# Patient Record
Sex: MALE | Race: WHITE | NOT HISPANIC OR LATINO
[De-identification: names, ages, dates, MRNs, and addresses within clinical notes are randomized per-mention and may not be internally consistent; named-entity substitution may affect disease eponyms.]

---

## 2022-05-20 PROBLEM — Z00.00 ENCOUNTER FOR PREVENTIVE HEALTH EXAMINATION: Status: ACTIVE | Noted: 2022-05-20

## 2022-05-24 ENCOUNTER — APPOINTMENT (OUTPATIENT)
Dept: RADIATION ONCOLOGY | Facility: CLINIC | Age: 72
End: 2022-05-24
Payer: MEDICARE

## 2022-05-24 VITALS
DIASTOLIC BLOOD PRESSURE: 59 MMHG | HEART RATE: 53 BPM | SYSTOLIC BLOOD PRESSURE: 163 MMHG | HEIGHT: 70 IN | RESPIRATION RATE: 18 BRPM | OXYGEN SATURATION: 99 %

## 2022-05-24 PROCEDURE — 99205 OFFICE O/P NEW HI 60 MIN: CPT | Mod: 25

## 2022-05-26 NOTE — DISEASE MANAGEMENT
[Clinical] : TNM Stage: c [I] : I [FreeTextEntry4] : Adenocarcinoma of the prostate, GS 3+3, PSA 9.38ng/ml [TTNM] : 1c [NTNM] : 0 [MTNM] : X

## 2022-05-26 NOTE — LETTER CLOSING
[Consult Closing:] : Thank you for allowing me to participate in the care of this patient.  If you have any questions, please do not hesitate to contact me. [Sincerely yours,] : Sincerely yours, [FreeTextEntry3] : Catherine Mondragon MD\par

## 2022-05-26 NOTE — HISTORY OF PRESENT ILLNESS
[FreeTextEntry1] : Mr. Bloomberg is a 71 year old male, diagnosed with clinical stage, [id number] low risk prostate adenocarcinoma, Howard score (3+3) and a pre-treatment PSA of 9.38 ng/ml, he is referred for a consultation to discuss radiation therapy options. \par \par Mr. Bloomberg is a patient under the care of Dr. York with a long standing history of an enlarged prostate over 10 years and elevated PSA since . He underwent US prostate in 2020 that revealed evidence of mild prostatic hypertrophy without areas suspicious for neoplasm identified. Mr. Bloomberg's PSA slowly began to rise.  In 2021 it was 6.88ng/ml.\par \par In May 2021, he underwent prostate biopsy and pathology revealed prostate, left mid, biopsy: PIN-like ductal adenocarcinoma of prostate, Di[place] grade 6 (3+3), imeasuring 2mm in linear length, occupying 7.5% of submitted tissue. No evidence of perineural or lymphatic invasion. No evidence of periprostatic fat or seminal vesicle extension. All other cores were negative, biopsy revealed 1/12 cores positive for adenocarcinoma. \par \par Mr. Bloomberg decided to pursue active surveillance.  However, more recently his PSA began to  rise and he was referred for consultation for definitive treatment. \par \par MRI Prostate (22) - 2 lesions detected (right, posterior medial lateral and right, posterior lateral), Prostate size: 4.6 cm. Volume: 55 mL. PSA density: 0.16 ng/mL/mL. PSA density >0.15 ng/mL/mL: Yes.  No extraprostatic extension, seminal vesicle invasion, or pelvic lymphadenopathy.  \par \par He is experiencing urinary symptoms such as, weak stream, difficulty starting stream occasionally, variable frequency and urgency and nocturia on average 2 - 3 times per night. Minimal dysuria, not significant as per patient. No bowel symptoms and he denies a history or irritable bowel syndrome. He denies erectile dysfunction issues and is sexually active. \par \par PSA\par 9.38 () \par 8.76 ng/ml (22)\par 8.23 ng/ml (11/3/21) \par 8.01 ng/ml (2021) \par 6.88 ng/ml (21) \par \par Family history of: brother, 70, CLL; mother, 99 years, breast and colon,  from CHF\par Social Hx: never a smoker, 2 - 4 glass per week.

## 2022-08-29 ENCOUNTER — TRANSCRIPTION ENCOUNTER (OUTPATIENT)
Age: 72
End: 2022-08-29

## 2024-04-22 ENCOUNTER — APPOINTMENT (OUTPATIENT)
Dept: RADIATION ONCOLOGY | Facility: CLINIC | Age: 74
End: 2024-04-22
Payer: MEDICARE

## 2024-04-22 VITALS
DIASTOLIC BLOOD PRESSURE: 75 MMHG | HEIGHT: 70 IN | HEART RATE: 58 BPM | OXYGEN SATURATION: 96 % | SYSTOLIC BLOOD PRESSURE: 141 MMHG | RESPIRATION RATE: 18 BRPM

## 2024-04-22 PROCEDURE — 99215 OFFICE O/P EST HI 40 MIN: CPT | Mod: 25

## 2024-04-22 RX ORDER — PRAVASTATIN SODIUM 20 MG/1
20 TABLET ORAL
Refills: 0 | Status: ACTIVE | COMMUNITY

## 2024-04-22 NOTE — VITALS
[Date: ____________] : Patient's last distress assessment performed on [unfilled]. [1 - Distress Level] : Distress Level: 1

## 2024-04-22 NOTE — REVIEW OF SYSTEMS
[Nocturia] : nocturia [Urinary Frequency] : urinary frequency [IPSS Score (0-40): ___] : IPSS score: [unfilled] [EPIC-CP Score (0-60): ___] : EPIC-CP score: [unfilled] [Negative] : Allergic/Immunologic

## 2024-04-24 NOTE — LETTER CLOSING
[Consult Closing:] : Thank you for allowing me to participate in the care of this patient.  If you have any questions, please do not hesitate to contact me. [Sincerely yours,] : Sincerely yours, [FreeTextEntry3] : Catherine Mondragon MD

## 2024-04-24 NOTE — DISEASE MANAGEMENT
[FreeTextEntry4] : Adenocarcinoma of the prostate, GS 3+3, PSA 9.38ng/ml [TTNM] : 1c [NTNM] : 0 [MTNM] : X

## 2024-04-24 NOTE — HISTORY OF PRESENT ILLNESS
[FreeTextEntry1] : Mr. Bloomberg is a 71 year old male, diagnosed with clinical stage, [id number] low risk prostate adenocarcinoma, Howard score (3+3) and a pre-treatment PSA of 9.38 ng/ml, he is referred for a consultation to discuss radiation therapy options. He is present today for re-evaluation after imaging demonstrated potential osseous metastasis.   Mr. Bloomberg is a patient under the care of Dr. York with a long standing history of an enlarged prostate over 10 years and elevated PSA since . He underwent US prostate in 2020 that revealed evidence of mild prostatic hypertrophy without areas suspicious for neoplasm identified. Mr. Bloomberg's PSA slowly began to rise.  In 2021 it was 6.88ng/ml.  In May 2021, he underwent prostate biopsy and pathology revealed prostate, left mid, biopsy: PIN-like ductal adenocarcinoma of prostate, Howard grade 6 (3+3), measuring 2mm in linear length, occupying 7.5% of submitted tissue. No evidence of perineural or lymphatic invasion. No evidence of periprostatic fat or seminal vesicle extension. All other cores were negative, biopsy revealed 1/12 cores positive for adenocarcinoma.   Mr. Bloomberg decided to pursue active surveillance.  However, more recently his PSA began to rise and he was referred for consultation for definitive treatment.   MRI Prostate (22) - 2 lesions detected (right, posterior medial lateral and right, posterior lateral), Prostate size: 4.6 cm. Volume: 55 mL. PSA density: 0.16 ng/mL/mL. PSA density >0.15 ng/mL/mL: Yes.  No extraprostatic extension, seminal vesicle invasion, or pelvic lymphadenopathy.    PSA 9.48 (24) 9.38 ()  8.76 ng/ml (22) 8.23 ng/ml (11/3/21)  8.01 ng/ml (2021)  6.88 ng/ml (21)   Family history of: brother, 70, CLL; mother, 99 years, breast and colon,  from CHF Social Hx: never a smoker, 2 - 4 glass per week.   MRI (2024) - bilateral acetabulum.   Bone scan (2024) focal sites of intense increased tracer localization in the right lower iliac bone and medial left acetabulum  suspicious for metastatic disease. Additional site in the spine and multiple joint, most likely arthritic.   PSA is now 9.48 ng/ml. He reports nocturia 3 times per night, weak urinary stream, difficulty starting stream, no bowel symptoms and had colonoscopy in 2017.  He denies any erectile dysfunction issues.   He reports bilateral hip pain, rating it a 1 - 2 out of 10 on the pain scale. He reports chronic lower back pain.   Mr. Bloomberg is present today to discuss potential palliative therapy to the prostate and potential areas of osseous metastasis.

## 2024-05-01 ENCOUNTER — NON-APPOINTMENT (OUTPATIENT)
Age: 74
End: 2024-05-01

## 2024-05-01 ENCOUNTER — RESULT REVIEW (OUTPATIENT)
Age: 74
End: 2024-05-01

## 2024-05-14 ENCOUNTER — RESULT REVIEW (OUTPATIENT)
Age: 74
End: 2024-05-14

## 2024-05-14 ENCOUNTER — APPOINTMENT (OUTPATIENT)
Dept: HEMATOLOGY ONCOLOGY | Facility: CLINIC | Age: 74
End: 2024-05-14
Payer: MEDICARE

## 2024-05-14 VITALS
HEIGHT: 70 IN | SYSTOLIC BLOOD PRESSURE: 131 MMHG | HEART RATE: 48 BPM | RESPIRATION RATE: 16 BRPM | DIASTOLIC BLOOD PRESSURE: 72 MMHG | WEIGHT: 166.44 LBS | BODY MASS INDEX: 23.83 KG/M2 | OXYGEN SATURATION: 98 % | TEMPERATURE: 97.1 F

## 2024-05-14 DIAGNOSIS — Z80.7 FAMILY HISTORY OF OTHER MALIGNANT NEOPLASMS OF LYMPHOID, HEMATOPOIETIC AND RELATED TISSUES: ICD-10-CM

## 2024-05-14 DIAGNOSIS — Z80.3 FAMILY HISTORY OF MALIGNANT NEOPLASM OF BREAST: ICD-10-CM

## 2024-05-14 DIAGNOSIS — Z80.0 FAMILY HISTORY OF MALIGNANT NEOPLASM OF DIGESTIVE ORGANS: ICD-10-CM

## 2024-05-14 PROCEDURE — 99205 OFFICE O/P NEW HI 60 MIN: CPT

## 2024-05-14 PROCEDURE — 36415 COLL VENOUS BLD VENIPUNCTURE: CPT

## 2024-05-21 ENCOUNTER — RESULT REVIEW (OUTPATIENT)
Age: 74
End: 2024-05-21

## 2024-05-21 PROBLEM — Z80.0 FAMILY HISTORY OF MALIGNANT NEOPLASM OF COLON: Status: ACTIVE | Noted: 2024-05-14

## 2024-05-21 PROBLEM — Z80.7 FAMILY HISTORY OF NON-HODGKIN'S LYMPHOMA: Status: ACTIVE | Noted: 2024-05-14

## 2024-05-21 NOTE — HISTORY OF PRESENT ILLNESS
[Disease: _____________________] : Disease: [unfilled] [M: ___] : M[unfilled] [AJCC Stage: ____] : AJCC Stage: [unfilled] [de-identified] : 73-year-old male, born in [place] presents today for initial consultation of prostate cancer, referred by Dr. Mondragon.  Mr. Bloomberg is a patient under the care of Dr. York with a long standing history of an enlarged prostate over 10 years and elevated PSA since 2020. He underwent US prostate in January 2020 that revealed evidence of mild prostatic hypertrophy without areas suspicious for neoplasm identified. Mr. Bloomberg's PSA slowly began to rise. In 4/2021 it was 6.88ng/ml.  In May 2021, he underwent prostate biopsy and pathology revealed prostate, left mid, biopsy: PIN-like ductal adenocarcinoma of prostate, [place] grade 6 (3+3), measuring 2mm in linear length, occupying 7.5% of submitted tissue. No evidence of perineural or lymphatic invasion. No evidence of periprostatic fat or seminal vesicle extension. All other cores were negative, biopsy revealed 1/12 cores positive for adenocarcinoma.  Mr. Bloomberg decided to pursue active surveillance. However, more recently his PSA began to rise and he was referred for consultation for definitive treatment.  MRI Prostate (5/6/22) - 2 lesions detected (right, posterior medial lateral and right, posterior lateral), Prostate size: 4.6 cm. Volume: 55 mL. PSA density: 0.16 ng/mL/mL. PSA density >0.15 ng/mL/mL: Yes. No extraprostatic extension, seminal vesicle invasion, or pelvic lymphadenopathy.  PSA 9.48 (4/5/24) 9.38 (59/22) 8.76 ng/ml (2/7/22) 8.23 ng/ml (11/3/21) 8.01 ng/ml (8/2021) 6.88 ng/ml (4/8/21)  MRI Pelvis (4/2024) - bilateral acetabulum lesions concerning for met disease  Bone scan (4/2024) focal sites of intense increased tracer localization in the right lower iliac bone and medial left acetabulum suspicious for metastatic disease. Additional site in the spine and multiple joint, most likely arthritic.  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 Bone biopsy ordered by Dr. Mondragon- not scheduled as of yet.  Patient started Lupron q 3 months 5/13/24 and bicalutamide- with Dr. Ortiz   Family history: Brother, 70, CLL Mother, 99 years, breast and colon Son with NHL at mid 30's- alive and well   Social Hx:  never a smoker 2 - 4 glass per week. Denies illicit drug use Retired from [employer]   with 3 children

## 2024-05-21 NOTE — CONSULT LETTER
[Dear  ___] : Dear  [unfilled], [Consult Letter:] : I had the pleasure of evaluating your patient, [unfilled]. [Please see my note below.] : Please see my note below. [Consult Closing:] : Thank you very much for allowing me to participate in the care of this patient.  If you have any questions, please do not hesitate to contact me. [Sincerely,] : Sincerely, [FreeTextEntry3] : Cande Stephens MD [facility]

## 2024-05-21 NOTE — ASSESSMENT
[FreeTextEntry1] : 3-year-old male, born in [place] presents today for initial consultation of prostate cancer, referred by Dr. Mondragon.  Mr. Bloomberg is a patient under the care of Dr. York with a long-standing history of an enlarged prostate over 10 years and elevated PSA since 2020. He underwent US prostate in January 2020 that revealed evidence of mild prostatic hypertrophy without areas suspicious for neoplasm identified. Mr. Bloomberg's PSA slowly began to rise. In 4/2021 it was 6.88ng/ml.  In May 2021, he underwent prostate biopsy and pathology revealed prostate, left mid, biopsy: PIN-like ductal adenocarcinoma of prostate, Howard grade 6 (3+3), measuring 2mm in linear length, occupying 7.5% of submitted tissue. No evidence of perineural or lymphatic invasion. No evidence of periprostatic fat or seminal vesicle extension. All other cores were negative, biopsy revealed 1/12 cores positive for adenocarcinoma.  Mr. Bloomberg decided to pursue active surveillance. However, more recently his PSA began to rise and he was referred for consultation for definitive treatment.  PET PSMA (5/1/2024.)  Prostate cancer with metastasis to pelvic lymph nodes and osseous structure. Bone biopsy ordered Reviewed adverse effects of ADT including but not limited to hot flushes, loss of libido, erectile disfunction, loss of muscle mass, fatigue, anemia, breast enlargement, tenderness, depression with mood swings, hair loss, osteoporosis, increased risk of fractures, obesity, dyslipidemia, increased risk of diabetes and cardiovascular disease.  Reviewed modalities for treatment of metastatic PCA, ongoing therapy, combination tx ARB and ADT. Discussed weight control and healthy eating habits.  Encourage to participate in moderate exercise to mitigate side effects and consider starting statins. Check PSA, testosterone  # Plan for radiation therapy  #Patient offered genetic testing. We discussed: Plan for genetic panel.  Reviewed with patient impact of positive vs negative results and that test might not be informative or . We discussed that blood related family members might be carrying the same genetic mutation. Test confidentiality. Options or limitations of medical surveillance and strategies for prevention after genetic testing results. Importance of sharing genetic test results with at-risk relatives they might benefit from this information. Insurance coverage and potential out of pocket costs were also discussed. The Genetic Information Non-discrimination Act (AIDEE) was reviewed, and she was provided with written information regarding AIDEE. Plan for follow up after testing  # Bone mets - plan for Zometa - dental evaluation.

## 2024-05-22 ENCOUNTER — RESULT REVIEW (OUTPATIENT)
Age: 74
End: 2024-05-22

## 2024-05-23 RX ORDER — APALUTAMIDE 60 MG/1
60 TABLET, FILM COATED ORAL
Qty: 240 | Refills: 3 | Status: ACTIVE | COMMUNITY
Start: 2024-05-21 | End: 1900-01-01

## 2024-05-28 ENCOUNTER — NON-APPOINTMENT (OUTPATIENT)
Age: 74
End: 2024-05-28

## 2024-05-29 ENCOUNTER — NON-APPOINTMENT (OUTPATIENT)
Age: 74
End: 2024-05-29

## 2024-05-30 ENCOUNTER — NON-APPOINTMENT (OUTPATIENT)
Age: 74
End: 2024-05-30

## 2024-05-31 ENCOUNTER — NON-APPOINTMENT (OUTPATIENT)
Age: 74
End: 2024-05-31

## 2024-06-05 ENCOUNTER — TRANSCRIPTION ENCOUNTER (OUTPATIENT)
Age: 74
End: 2024-06-05

## 2024-06-06 ENCOUNTER — RESULT REVIEW (OUTPATIENT)
Age: 74
End: 2024-06-06

## 2024-06-06 ENCOUNTER — APPOINTMENT (OUTPATIENT)
Dept: HEMATOLOGY ONCOLOGY | Facility: CLINIC | Age: 74
End: 2024-06-06
Payer: MEDICARE

## 2024-06-06 VITALS
HEIGHT: 70 IN | TEMPERATURE: 98.6 F | BODY MASS INDEX: 22.65 KG/M2 | HEART RATE: 63 BPM | WEIGHT: 158.25 LBS | OXYGEN SATURATION: 96 % | RESPIRATION RATE: 16 BRPM | SYSTOLIC BLOOD PRESSURE: 147 MMHG | DIASTOLIC BLOOD PRESSURE: 80 MMHG

## 2024-06-06 DIAGNOSIS — C79.51 SECONDARY MALIGNANT NEOPLASM OF BONE: ICD-10-CM

## 2024-06-06 PROCEDURE — 36415 COLL VENOUS BLD VENIPUNCTURE: CPT

## 2024-06-06 PROCEDURE — 99214 OFFICE O/P EST MOD 30 MIN: CPT

## 2024-06-06 RX ORDER — TAMSULOSIN HYDROCHLORIDE 0.4 MG/1
0.4 CAPSULE ORAL
Refills: 0 | Status: ACTIVE | COMMUNITY

## 2024-06-14 PROBLEM — C79.51 METASTATIC ADENOCARCINOMA TO BONE: Status: ACTIVE | Noted: 2024-05-21

## 2024-06-14 NOTE — REASON FOR VISIT
[Initial Consultation] : an initial consultation [Follow-Up Visit] : a follow-up [FreeTextEntry2] : Prostate cancer

## 2024-06-14 NOTE — HISTORY OF PRESENT ILLNESS
[Disease: _____________________] : Disease: [unfilled] [M: ___] : M[unfilled] [AJCC Stage: ____] : AJCC Stage: [unfilled] [de-identified] : 73-year-old male, born in [place] presents today for initial consultation of prostate cancer, referred by Dr. Mondragon.  Mr. Bloomberg is a patient under the care of Dr. York with a long standing history of an enlarged prostate over 10 years and elevated PSA since 2020. He underwent US prostate in January 2020 that revealed evidence of mild prostatic hypertrophy without areas suspicious for neoplasm identified. Mr. Bloomberg's PSA slowly began to rise. In 4/2021 it was 6.88ng/ml.  In May 2021, he underwent prostate biopsy and pathology revealed prostate, left mid, biopsy: PIN-like ductal adenocarcinoma of prostate, [place] grade 6 (3+3), measuring 2mm in linear length, occupying 7.5% of submitted tissue. No evidence of perineural or lymphatic invasion. No evidence of periprostatic fat or seminal vesicle extension. All other cores were negative, biopsy revealed 1/12 cores positive for adenocarcinoma.  Mr. Bloomberg decided to pursue active surveillance. However, more recently his PSA began to rise and he was referred for consultation for definitive treatment.  MRI Prostate (5/6/22) - 2 lesions detected (right, posterior medial lateral and right, posterior lateral), Prostate size: 4.6 cm. Volume: 55 mL. PSA density: 0.16 ng/mL/mL. PSA density >0.15 ng/mL/mL: Yes. No extraprostatic extension, seminal vesicle invasion, or pelvic lymphadenopathy.  PSA 9.48 (4/5/24) 9.38 (59/22) 8.76 ng/ml (2/7/22) 8.23 ng/ml (11/3/21) 8.01 ng/ml (8/2021) 6.88 ng/ml (4/8/21)  MRI Pelvis (4/2024) - bilateral acetabulum lesions concerning for met disease  Bone scan (4/2024) focal sites of intense increased tracer localization in the right lower iliac bone and medial left acetabulum suspicious for metastatic disease. Additional site in the spine and multiple joint, most likely arthritic.  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 Bone biopsy ordered by Dr. Mondragon- not scheduled as of yet.  Patient started Lupron q 3 months 5/13/24 and bicalutamide- with Dr. Ortiz   Family history: Brother, 70, CLL Mother, 99 years, breast and colon Son with NHL at mid 30's- alive and well   Social Hx:  never a smoker 2 - 4 glass per week. Denies illicit drug use Retired from [employer]   with 3 children  [de-identified] : Patient is here for follow up for prostate cancer. He had a spacer placed on Tuesday 6/4/2024 and a reynolds was placed then and he is getting it removed tonight.  Radiation simulation next Tuesday. Patient started Erleada 60mg June 1st, 2024 and tolerating it well and stopped taking bicalutamide.  His urologist started him on Flomax

## 2024-06-14 NOTE — CONSULT LETTER
[Dear  ___] : Dear  [unfilled], [Consult Letter:] : I had the pleasure of evaluating your patient, [unfilled]. [Please see my note below.] : Please see my note below. [Consult Closing:] : Thank you very much for allowing me to participate in the care of this patient.  If you have any questions, please do not hesitate to contact me. [Sincerely,] : Sincerely, [FreeTextEntry3] : Cande Stephens MD [facility]

## 2024-06-14 NOTE — ASSESSMENT
[FreeTextEntry1] : 73-year-old male, born in [place] presents today for initial consultation of prostate cancer, referred by Dr. Mondragon.  Mr. Bloomberg is a patient under the care of Dr. York with a long-standing history of an enlarged prostate over 10 years and elevated PSA since 2020. He underwent US prostate in January 2020 that revealed evidence of mild prostatic hypertrophy without areas suspicious for neoplasm identified. Mr. Bloomberg's PSA slowly began to rise. In 4/2021 it was 6.88ng/ml.  In May 2021, he underwent prostate biopsy and pathology revealed prostate, left mid, biopsy: PIN-like ductal adenocarcinoma of prostate, Howard grade 6 (3+3), measuring 2mm in linear length, occupying 7.5% of submitted tissue. No evidence of perineural or lymphatic invasion. No evidence of periprostatic fat or seminal vesicle extension. All other cores were negative, biopsy revealed 1/12 cores positive for adenocarcinoma.  Mr. Bloomberg decided to pursue active surveillance. However, more recently his PSA began to rise and he was referred for consultation for definitive treatment.  PET PSMA (5/1/2024.)  Prostate cancer with metastasis to pelvic lymph nodes and osseous structure. Bone biopsy ordered Reviewed adverse effects of ADT including but not limited to hot flushes, loss of libido, erectile disfunction, loss of muscle mass, fatigue, anemia, breast enlargement, tenderness, depression with mood swings, hair loss, osteoporosis, increased risk of fractures, obesity, dyslipidemia, increased risk of diabetes and cardiovascular disease.  Reviewed modalities for treatment of metastatic PCA, ongoing therapy, combination tx ARB and ADT. Discussed weight control and healthy eating habits.  Encourage to participate in moderate exercise to mitigate side effects and consider starting statins. Check PSA, testosterone  # Plan for radiation therapy  #Patient offered genetic testing. - negative invitae 5/21/24  # Bone mets - plan for Zometa -start Zometa 6/6/24  Biopsy negative for PCA, reviewed images with radiologist, definitely c/w metastatic disease. D/w patient no indication for additional biopsy

## 2024-07-02 ENCOUNTER — NON-APPOINTMENT (OUTPATIENT)
Age: 74
End: 2024-07-02

## 2024-07-02 VITALS — RESPIRATION RATE: 18 BRPM | SYSTOLIC BLOOD PRESSURE: 141 MMHG | DIASTOLIC BLOOD PRESSURE: 78 MMHG | HEART RATE: 57 BPM

## 2024-07-02 PROBLEM — C61 ADENOCARCINOMA OF PROSTATE: Status: ACTIVE | Noted: 2024-04-22

## 2024-07-09 ENCOUNTER — RESULT REVIEW (OUTPATIENT)
Age: 74
End: 2024-07-09

## 2024-07-09 ENCOUNTER — APPOINTMENT (OUTPATIENT)
Dept: HEMATOLOGY ONCOLOGY | Facility: CLINIC | Age: 74
End: 2024-07-09
Payer: MEDICARE

## 2024-07-09 ENCOUNTER — NON-APPOINTMENT (OUTPATIENT)
Age: 74
End: 2024-07-09

## 2024-07-09 VITALS
TEMPERATURE: 97.8 F | OXYGEN SATURATION: 97 % | HEART RATE: 56 BPM | HEIGHT: 70 IN | RESPIRATION RATE: 16 BRPM | BODY MASS INDEX: 21.98 KG/M2 | WEIGHT: 153.56 LBS | DIASTOLIC BLOOD PRESSURE: 71 MMHG | SYSTOLIC BLOOD PRESSURE: 120 MMHG

## 2024-07-09 VITALS
OXYGEN SATURATION: 98 % | RESPIRATION RATE: 18 BRPM | DIASTOLIC BLOOD PRESSURE: 66 MMHG | SYSTOLIC BLOOD PRESSURE: 141 MMHG | HEART RATE: 58 BPM

## 2024-07-09 DIAGNOSIS — C79.51 SECONDARY MALIGNANT NEOPLASM OF BONE: ICD-10-CM

## 2024-07-09 DIAGNOSIS — R74.01 ELEVATION OF LEVELS OF LIVER TRANSAMINASE LEVELS: ICD-10-CM

## 2024-07-09 PROCEDURE — 99214 OFFICE O/P EST MOD 30 MIN: CPT

## 2024-07-09 PROCEDURE — 36415 COLL VENOUS BLD VENIPUNCTURE: CPT

## 2024-07-15 ENCOUNTER — RESULT REVIEW (OUTPATIENT)
Age: 74
End: 2024-07-15

## 2024-07-16 ENCOUNTER — NON-APPOINTMENT (OUTPATIENT)
Age: 74
End: 2024-07-16

## 2024-07-16 ENCOUNTER — APPOINTMENT (OUTPATIENT)
Dept: HEMATOLOGY ONCOLOGY | Facility: CLINIC | Age: 74
End: 2024-07-16

## 2024-07-16 ENCOUNTER — RESULT REVIEW (OUTPATIENT)
Age: 74
End: 2024-07-16

## 2024-07-16 VITALS
OXYGEN SATURATION: 98 % | BODY MASS INDEX: 21.76 KG/M2 | SYSTOLIC BLOOD PRESSURE: 152 MMHG | WEIGHT: 152 LBS | TEMPERATURE: 97.7 F | HEIGHT: 70 IN | RESPIRATION RATE: 16 BRPM | DIASTOLIC BLOOD PRESSURE: 66 MMHG | HEART RATE: 54 BPM

## 2024-07-16 VITALS
SYSTOLIC BLOOD PRESSURE: 149 MMHG | RESPIRATION RATE: 18 BRPM | HEART RATE: 51 BPM | OXYGEN SATURATION: 98 % | DIASTOLIC BLOOD PRESSURE: 83 MMHG

## 2024-07-16 DIAGNOSIS — C61 MALIGNANT NEOPLASM OF PROSTATE: ICD-10-CM

## 2024-07-23 ENCOUNTER — NON-APPOINTMENT (OUTPATIENT)
Age: 74
End: 2024-07-23

## 2024-07-23 VITALS
OXYGEN SATURATION: 98 % | HEART RATE: 53 BPM | WEIGHT: 152 LBS | HEIGHT: 70 IN | DIASTOLIC BLOOD PRESSURE: 81 MMHG | SYSTOLIC BLOOD PRESSURE: 108 MMHG | BODY MASS INDEX: 21.76 KG/M2 | RESPIRATION RATE: 18 BRPM | TEMPERATURE: 98.2 F

## 2024-07-25 NOTE — HISTORY OF PRESENT ILLNESS
[FreeTextEntry1] : Mr. Bloomberg is a 73-year-old male, diagnosed with clinical stage, [id number] low risk prostate adenocarcinoma, Howard score (3+3) and a pre-treatment PSA of 9.38 ng/ml.   He presents today for OTV. He is status post fraction 18 / 20 of radiation to the prostate and seminal vesicles. He reports stable urinary frequency and urgency. He reports nocturia is persistently frequency several times per night. He continues on Flomax once per day. He has multiple bowel movement per day but denies loose stools. He has grade 1-2 fatigue  and is exercising a little less.

## 2024-07-25 NOTE — DISEASE MANAGEMENT
[Clinical] : TNM Stage: c [I] : I [FreeTextEntry4] : Adenocarcinoma of the prostate, GS 3+3, PSA 9.38ng/ml [TTNM] : 1c [NTNM] : 0 [MTNM] : X [de-identified] : 4950 cGy [de-identified] : 5500 cGy [de-identified] : prostate/SV

## 2024-08-14 ENCOUNTER — NON-APPOINTMENT (OUTPATIENT)
Age: 74
End: 2024-08-14

## 2024-08-18 NOTE — DISEASE MANAGEMENT
[Clinical] : TNM Stage: c [I] : I [FreeTextEntry4] : Adenocarcinoma of the prostate, GS 3+3, PSA 9.38ng/ml [TTNM] : 1c [NTNM] : 0 [MTNM] : X [de-identified] : 9786 [de-identified] : 1581 [de-identified] : Left rib & T6/7:1

## 2024-08-18 NOTE — HISTORY OF PRESENT ILLNESS
[FreeTextEntry1] : Mr. Bloomberg is a 73-year-old male, diagnosed with clinical stage, [id number] low risk prostate adenocarcinoma, Howard score (3+3) and a pre-treatment PSA of 9.38 ng/ml. He completed 5500/5500 cGy RT to the prostate/sv on 7/25/24.  He presents today for OTV. He is status post fraction 3/3 of radiation to the Left rib & T6/7:1. Patient denies pain in the ribs or thoracic spine.  Urinary symptoms s/p RT prostate are persistent.

## 2024-08-20 ENCOUNTER — RESULT REVIEW (OUTPATIENT)
Age: 74
End: 2024-08-20

## 2024-08-20 ENCOUNTER — APPOINTMENT (OUTPATIENT)
Dept: HEMATOLOGY ONCOLOGY | Facility: CLINIC | Age: 74
End: 2024-08-20

## 2024-08-20 VITALS
DIASTOLIC BLOOD PRESSURE: 79 MMHG | WEIGHT: 151.31 LBS | RESPIRATION RATE: 17 BRPM | BODY MASS INDEX: 21.66 KG/M2 | TEMPERATURE: 97.4 F | OXYGEN SATURATION: 96 % | SYSTOLIC BLOOD PRESSURE: 127 MMHG | HEIGHT: 70 IN

## 2024-08-30 ENCOUNTER — NON-APPOINTMENT (OUTPATIENT)
Age: 74
End: 2024-08-30

## 2024-08-30 ENCOUNTER — APPOINTMENT (OUTPATIENT)
Dept: RADIATION ONCOLOGY | Facility: CLINIC | Age: 74
End: 2024-08-30

## 2024-09-09 ENCOUNTER — NON-APPOINTMENT (OUTPATIENT)
Age: 74
End: 2024-09-09

## 2024-09-09 DIAGNOSIS — C61 MALIGNANT NEOPLASM OF PROSTATE: ICD-10-CM

## 2024-09-09 NOTE — HISTORY OF PRESENT ILLNESS
[FreeTextEntry1] : Mr. Bloomberg is a 73-year-old male, diagnosed with clinical stage, [id number] low risk prostate adenocarcinoma, Howard score (3+3) and a pre-treatment PSA of 9.38 ng/ml. He completed 5500/5500 cGy RT to the prostate/sv on 7/25/24. He is status post fraction 3/3 of radiation to the Left rib & T6/7:1  He presents today for OTV. He is status post fraction 2 / 3 to the left hip and sacrum. He denies any pain or discomfort at this time. He continues of Erleda under the care of Dr. Stephens. He is scheduled to complete treatment tomorrow, 9/10/24. Overall, he is doing well. He feels that his urinary and bowel symptoms have returned close to baseline.

## 2024-09-09 NOTE — DISEASE MANAGEMENT
[Clinical] : TNM Stage: c [I] : I [FreeTextEntry4] : Adenocarcinoma of the prostate, GS 3+3, PSA 9.38ng/ml [TTNM] : 1c [NTNM] : 0 [MTNM] : X [de-identified] : 9774 [de-identified] : 7335 [de-identified] : Left rib & T6/7:1

## 2024-10-01 ENCOUNTER — APPOINTMENT (OUTPATIENT)
Dept: HEMATOLOGY ONCOLOGY | Facility: CLINIC | Age: 74
End: 2024-10-01
Payer: MEDICARE

## 2024-10-01 ENCOUNTER — RESULT REVIEW (OUTPATIENT)
Age: 74
End: 2024-10-01

## 2024-10-01 ENCOUNTER — NON-APPOINTMENT (OUTPATIENT)
Age: 74
End: 2024-10-01

## 2024-10-01 VITALS
HEIGHT: 70 IN | OXYGEN SATURATION: 99 % | TEMPERATURE: 97.9 F | WEIGHT: 153.06 LBS | BODY MASS INDEX: 21.91 KG/M2 | DIASTOLIC BLOOD PRESSURE: 78 MMHG | HEART RATE: 56 BPM | SYSTOLIC BLOOD PRESSURE: 144 MMHG | RESPIRATION RATE: 16 BRPM

## 2024-10-01 DIAGNOSIS — C79.51 SECONDARY MALIGNANT NEOPLASM OF BONE: ICD-10-CM

## 2024-10-01 DIAGNOSIS — C61 MALIGNANT NEOPLASM OF PROSTATE: ICD-10-CM

## 2024-10-01 PROCEDURE — 99213 OFFICE O/P EST LOW 20 MIN: CPT

## 2024-10-01 PROCEDURE — 36415 COLL VENOUS BLD VENIPUNCTURE: CPT

## 2024-10-01 NOTE — BEGINNING OF VISIT
[0] : 2) Feeling down, depressed, or hopeless: Not at all (0) [PHQ-2 Negative] : PHQ-2 Negative [[id number]] : 0 [Pain Scale: ___] : On a scale of 1-10, today the patient's pain is a(n) [unfilled]. [Never] : Never [Date Discussed (MM/DD/YY): ___] : Discussed: [unfilled] [Patient/Caregiver not ready to engage] : Patient/Caregiver not ready to engage

## 2024-10-01 NOTE — ASSESSMENT
[Designated Health Care Proxy] : Designated Health Care Proxy [Name: ___] : Name: [unfilled] [Relationship: ___] : Relationship: [unfilled] [FreeTextEntry1] : 73-year-old male, born in [place] presents today for initial consultation of prostate cancer, referred by Dr. Mondragon.  Mr. Bloomberg is a patient under the care of Dr. York with a long-standing history of an enlarged prostate over 10 years and elevated PSA since 2020. He underwent US prostate in January 2020 that revealed evidence of mild prostatic hypertrophy without areas suspicious for neoplasm identified. Mr. Bloomberg's PSA slowly began to rise. In 4/2021 it was 6.88ng/ml.  In May 2021, he underwent prostate biopsy and pathology revealed prostate, left mid, biopsy: PIN-like ductal adenocarcinoma of prostate, Howard grade 6 (3+3), measuring 2mm in linear length, occupying 7.5% of submitted tissue. No evidence of perineural or lymphatic invasion. No evidence of periprostatic fat or seminal vesicle extension. All other cores were negative, biopsy revealed 1/12 cores positive for adenocarcinoma.  Mr. Bloomberg decided to pursue active surveillance. However, more recently his PSA began to rise and he was referred for consultation for definitive treatment.  PET PSMA (5/1/2024.)  Prostate cancer with metastasis to pelvic lymph nodes and osseous structure. Bone biopsy ordered Reviewed adverse effects of ADT including but not limited to hot flushes, loss of libido, erectile disfunction, loss of muscle mass, fatigue, anemia, breast enlargement, tenderness, depression with mood swings, hair loss, osteoporosis, increased risk of fractures, obesity, dyslipidemia, increased risk of diabetes and cardiovascular disease.  Reviewed modalities for treatment of metastatic PCA, ongoing therapy, combination tx ARB and ADT. Discussed weight control and healthy eating habits.  Encourage to participate in moderate exercise to mitigate side effects and consider starting statins. Check PSA, testosterone  #  radiation therapy- completed 9/11/24 to sacrum/ left hip/ right iliac   #Patient offered genetic testing. - negative invitae 5/21/24  # Bone mets - plan for Zometa -start Zometa 6/6/24, Zometa # 4- 10/1/24 (hold due to dental work) -dexa evaluation  #PSA trending down 0.17- repeat today   Lupron 3 month with Dr. Ortiz 8/13/24  # Transaminitis  - repeat labs in 1 week  Biopsy negative for PCA, reviewed images with radiologist, definitely c/w metastatic disease. D/w patient no indication for additional biopsy  case and mgmt discussed with Dr. Stephens- return in 6 weeks for labs with tx and 12 weeks for OVT  [AdvancecareDate] : 07/09/2024

## 2024-10-01 NOTE — HISTORY OF PRESENT ILLNESS
[Disease: _____________________] : Disease: [unfilled] [M: ___] : M[unfilled] [AJCC Stage: ____] : AJCC Stage: [unfilled] [de-identified] : 73-year-old male, born in [place] presents today for initial consultation of prostate cancer, referred by Dr. Mondragon.  Mr. Bloomberg is a patient under the care of Dr. York with a long standing history of an enlarged prostate over 10 years and elevated PSA since 2020. He underwent US prostate in January 2020 that revealed evidence of mild prostatic hypertrophy without areas suspicious for neoplasm identified. Mr. Bloomberg's PSA slowly began to rise. In 4/2021 it was 6.88ng/ml.  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 No evidence of perineural or lymphatic invasion. No evidence of periprostatic fat or seminal vesicle extension. All other cores were negative, biopsy revealed 1/12 cores positive for adenocarcinoma.  Mr. Bloomberg decided to pursue active surveillance. However, more recently his PSA began to rise and he was referred for consultation for definitive treatment.  MRI Prostate (5/6/22) - 2 lesions detected (right, posterior medial lateral and right, posterior lateral), Prostate size: 4.6 cm. Volume: 55 mL. PSA density: 0.16 ng/mL/mL. PSA density >0.15 ng/mL/mL: Yes. No extraprostatic extension, seminal vesicle invasion, or pelvic lymphadenopathy.  PSA 9.48 (4/5/24) 9.38 (59/22) 8.76 ng/ml (2/7/22) 8.23 ng/ml (11/3/21) 8.01 ng/ml (8/2021) 6.88 ng/ml (4/8/21)  MRI Pelvis (4/2024) - bilateral acetabulum lesions concerning for met disease  Bone scan (4/2024) focal sites of intense increased tracer localization in the right lower iliac bone and medial left acetabulum suspicious for metastatic disease. Additional site in the spine and multiple joint, most likely arthritic.  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 Bone biopsy ordered by Dr. Mondragon- not scheduled as of yet.  Patient started Lupron q 3 months 5/13/24 and bicalutamide- with Dr. Ortiz   Family history: Brother, 70, CLL Mother, 99 years, breast and colon Son with NHL at mid 30's- alive and well   Social Hx:  never a smoker 2 - 4 glass per week. Denies illicit drug use Retired from [employer]   with 3 children  [de-identified] : Patient is here for follow up for prostate cancer. He is still on Erleada 60mg and tolerating it well.  He had a spacer placed on 6/4/2024 with reynolds drainage. Currently no reynolds and states that his urination has been fine.  Completed RT Sept 2024

## 2024-10-01 NOTE — CONSULT LETTER
[Dear  ___] : Dear  [unfilled], [Consult Letter:] : I had the pleasure of evaluating your patient, [unfilled]. [Please see my note below.] : Please see my note below. [Consult Closing:] : Thank you very much for allowing me to participate in the care of this patient.  If you have any questions, please do not hesitate to contact me. [Sincerely,] : Sincerely, [FreeTextEntry3] : Cande Stephens MD [facility]

## 2024-10-01 NOTE — BEGINNING OF VISIT
[0] : 2) Feeling down, depressed, or hopeless: Not at all (0) [PHQ-2 Negative] : PHQ-2 Negative [EGN3Hbbnc] : 0 [Pain Scale: ___] : On a scale of 1-10, today the patient's pain is a(n) [unfilled]. [Never] : Never [Date Discussed (MM/DD/YY): ___] : Discussed: [unfilled] [Patient/Caregiver not ready to engage] : Patient/Caregiver not ready to engage

## 2024-10-01 NOTE — CONSULT LETTER
[Dear  ___] : Dear  [unfilled], [Consult Letter:] : I had the pleasure of evaluating your patient, [unfilled]. [Please see my note below.] : Please see my note below. [Consult Closing:] : Thank you very much for allowing me to participate in the care of this patient.  If you have any questions, please do not hesitate to contact me. [Sincerely,] : Sincerely, [FreeTextEntry3] : Cande Stephens MD [facility]

## 2024-10-01 NOTE — HISTORY OF PRESENT ILLNESS
[Disease: _____________________] : Disease: [unfilled] [M: ___] : M[unfilled] [AJCC Stage: ____] : AJCC Stage: [unfilled] [de-identified] : 73-year-old male, born in [place] presents today for initial consultation of prostate cancer, referred by Dr. Mondragon.  Mr. Bloomberg is a patient under the care of Dr. York with a long standing history of an enlarged prostate over 10 years and elevated PSA since 2020. He underwent US prostate in January 2020 that revealed evidence of mild prostatic hypertrophy without areas suspicious for neoplasm identified. Mr. Bloomberg's PSA slowly began to rise. In 4/2021 it was 6.88ng/ml.  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 No evidence of perineural or lymphatic invasion. No evidence of periprostatic fat or seminal vesicle extension. All other cores were negative, biopsy revealed 1/12 cores positive for adenocarcinoma.  Mr. Bloomberg decided to pursue active surveillance. However, more recently his PSA began to rise and he was referred for consultation for definitive treatment.  MRI Prostate (5/6/22) - 2 lesions detected (right, posterior medial lateral and right, posterior lateral), Prostate size: 4.6 cm. Volume: 55 mL. PSA density: 0.16 ng/mL/mL. PSA density >0.15 ng/mL/mL: Yes. No extraprostatic extension, seminal vesicle invasion, or pelvic lymphadenopathy.  PSA 9.48 (4/5/24) 9.38 (59/22) 8.76 ng/ml (2/7/22) 8.23 ng/ml (11/3/21) 8.01 ng/ml (8/2021) 6.88 ng/ml (4/8/21)  MRI Pelvis (4/2024) - bilateral acetabulum lesions concerning for met disease  Bone scan (4/2024) focal sites of intense increased tracer localization in the right lower iliac bone and medial left acetabulum suspicious for metastatic disease. Additional site in the spine and multiple joint, most likely arthritic.  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 Bone biopsy ordered by Dr. Mondragon- not scheduled as of yet.  Patient started Lupron q 3 months 5/13/24 and bicalutamide- with Dr. Ortiz   Family history: Brother, 70, CLL Mother, 99 years, breast and colon Son with NHL at mid 30's- alive and well   Social Hx:  never a smoker 2 - 4 glass per week. Denies illicit drug use Retired from [employer]   with 3 children  [de-identified] : Patient is here for follow up for prostate cancer. He is still on Erleada 60mg and tolerating it well.  He had a spacer placed on 6/4/2024 with reynolds drainage. Currently no reynolds and states that his urination has been fine.  Completed RT Sept 2024

## 2024-10-14 ENCOUNTER — APPOINTMENT (OUTPATIENT)
Dept: GASTROENTEROLOGY | Facility: CLINIC | Age: 74
End: 2024-10-14
Payer: MEDICARE

## 2024-10-14 VITALS
HEIGHT: 70 IN | HEART RATE: 82 BPM | BODY MASS INDEX: 21.47 KG/M2 | WEIGHT: 150 LBS | OXYGEN SATURATION: 97 % | DIASTOLIC BLOOD PRESSURE: 80 MMHG | SYSTOLIC BLOOD PRESSURE: 142 MMHG

## 2024-10-14 DIAGNOSIS — R10.11 RIGHT UPPER QUADRANT PAIN: ICD-10-CM

## 2024-10-14 DIAGNOSIS — Z80.0 FAMILY HISTORY OF MALIGNANT NEOPLASM OF DIGESTIVE ORGANS: ICD-10-CM

## 2024-10-14 PROCEDURE — 99204 OFFICE O/P NEW MOD 45 MIN: CPT

## 2024-10-14 RX ORDER — LEUPROLIDE ACETATE 1 MG/0.2ML
5 VIAL (ML) SUBCUTANEOUS
Refills: 0 | Status: ACTIVE | COMMUNITY

## 2024-10-15 ENCOUNTER — RESULT REVIEW (OUTPATIENT)
Age: 74
End: 2024-10-15

## 2024-10-18 ENCOUNTER — RESULT REVIEW (OUTPATIENT)
Age: 74
End: 2024-10-18

## 2024-10-30 ENCOUNTER — APPOINTMENT (OUTPATIENT)
Dept: RADIATION ONCOLOGY | Facility: CLINIC | Age: 74
End: 2024-10-30
Payer: MEDICARE

## 2024-10-30 VITALS
RESPIRATION RATE: 18 BRPM | SYSTOLIC BLOOD PRESSURE: 143 MMHG | DIASTOLIC BLOOD PRESSURE: 78 MMHG | HEART RATE: 56 BPM | OXYGEN SATURATION: 98 %

## 2024-10-30 DIAGNOSIS — C61 MALIGNANT NEOPLASM OF PROSTATE: ICD-10-CM

## 2024-10-30 PROCEDURE — G2211 COMPLEX E/M VISIT ADD ON: CPT

## 2024-10-30 PROCEDURE — 99213 OFFICE O/P EST LOW 20 MIN: CPT

## 2024-11-26 ENCOUNTER — RESULT REVIEW (OUTPATIENT)
Age: 74
End: 2024-11-26

## 2024-11-26 ENCOUNTER — APPOINTMENT (OUTPATIENT)
Dept: HEMATOLOGY ONCOLOGY | Facility: CLINIC | Age: 74
End: 2024-11-26

## 2024-11-26 VITALS
SYSTOLIC BLOOD PRESSURE: 158 MMHG | WEIGHT: 154 LBS | OXYGEN SATURATION: 98 % | HEIGHT: 70 IN | HEART RATE: 50 BPM | TEMPERATURE: 96.9 F | RESPIRATION RATE: 16 BRPM | BODY MASS INDEX: 22.05 KG/M2 | DIASTOLIC BLOOD PRESSURE: 73 MMHG

## 2024-11-26 VITALS
RESPIRATION RATE: 16 BRPM | BODY MASS INDEX: 23.54 KG/M2 | DIASTOLIC BLOOD PRESSURE: 70 MMHG | OXYGEN SATURATION: 95 % | HEIGHT: 70 IN | SYSTOLIC BLOOD PRESSURE: 127 MMHG | HEART RATE: 76 BPM | TEMPERATURE: 97.1 F | WEIGHT: 164.44 LBS

## 2024-12-02 ENCOUNTER — APPOINTMENT (OUTPATIENT)
Dept: HEMATOLOGY ONCOLOGY | Facility: CLINIC | Age: 74
End: 2024-12-02

## 2024-12-02 ENCOUNTER — NON-APPOINTMENT (OUTPATIENT)
Age: 74
End: 2024-12-02

## 2025-01-13 ENCOUNTER — APPOINTMENT (OUTPATIENT)
Dept: HEMATOLOGY ONCOLOGY | Facility: CLINIC | Age: 75
End: 2025-01-13
Payer: MEDICARE

## 2025-01-13 ENCOUNTER — RESULT REVIEW (OUTPATIENT)
Age: 75
End: 2025-01-13

## 2025-01-13 VITALS
SYSTOLIC BLOOD PRESSURE: 150 MMHG | RESPIRATION RATE: 16 BRPM | OXYGEN SATURATION: 100 % | TEMPERATURE: 97.8 F | HEIGHT: 70 IN | DIASTOLIC BLOOD PRESSURE: 81 MMHG | WEIGHT: 156.13 LBS | HEART RATE: 55 BPM | BODY MASS INDEX: 22.35 KG/M2

## 2025-01-13 DIAGNOSIS — C79.51 SECONDARY MALIGNANT NEOPLASM OF BONE: ICD-10-CM

## 2025-01-13 DIAGNOSIS — C61 MALIGNANT NEOPLASM OF PROSTATE: ICD-10-CM

## 2025-01-13 PROCEDURE — 36415 COLL VENOUS BLD VENIPUNCTURE: CPT

## 2025-01-13 PROCEDURE — 99214 OFFICE O/P EST MOD 30 MIN: CPT

## 2025-01-30 ENCOUNTER — APPOINTMENT (OUTPATIENT)
Dept: RADIATION ONCOLOGY | Facility: CLINIC | Age: 75
End: 2025-01-30
Payer: MEDICARE

## 2025-01-30 VITALS
RESPIRATION RATE: 16 BRPM | DIASTOLIC BLOOD PRESSURE: 63 MMHG | SYSTOLIC BLOOD PRESSURE: 128 MMHG | WEIGHT: 152 LBS | BODY MASS INDEX: 21.81 KG/M2 | HEART RATE: 52 BPM | OXYGEN SATURATION: 98 %

## 2025-01-30 PROCEDURE — 99215 OFFICE O/P EST HI 40 MIN: CPT

## 2025-01-30 PROCEDURE — G2211 COMPLEX E/M VISIT ADD ON: CPT

## 2025-04-22 ENCOUNTER — NON-APPOINTMENT (OUTPATIENT)
Age: 75
End: 2025-04-22

## 2025-04-24 ENCOUNTER — APPOINTMENT (OUTPATIENT)
Dept: HEMATOLOGY ONCOLOGY | Facility: CLINIC | Age: 75
End: 2025-04-24
Payer: MEDICARE

## 2025-04-24 ENCOUNTER — RESULT REVIEW (OUTPATIENT)
Age: 75
End: 2025-04-24

## 2025-04-24 ENCOUNTER — NON-APPOINTMENT (OUTPATIENT)
Age: 75
End: 2025-04-24

## 2025-04-24 VITALS
DIASTOLIC BLOOD PRESSURE: 73 MMHG | HEIGHT: 70 IN | WEIGHT: 162.38 LBS | RESPIRATION RATE: 16 BRPM | TEMPERATURE: 97.6 F | BODY MASS INDEX: 23.25 KG/M2 | SYSTOLIC BLOOD PRESSURE: 149 MMHG | HEART RATE: 50 BPM | OXYGEN SATURATION: 99 %

## 2025-04-24 DIAGNOSIS — C61 MALIGNANT NEOPLASM OF PROSTATE: ICD-10-CM

## 2025-04-24 DIAGNOSIS — C79.51 SECONDARY MALIGNANT NEOPLASM OF BONE: ICD-10-CM

## 2025-04-24 PROCEDURE — 99214 OFFICE O/P EST MOD 30 MIN: CPT

## 2025-04-24 PROCEDURE — 36415 COLL VENOUS BLD VENIPUNCTURE: CPT

## 2025-07-24 ENCOUNTER — RESULT REVIEW (OUTPATIENT)
Age: 75
End: 2025-07-24

## 2025-07-24 ENCOUNTER — APPOINTMENT (OUTPATIENT)
Dept: HEMATOLOGY ONCOLOGY | Facility: CLINIC | Age: 75
End: 2025-07-24
Payer: MEDICARE

## 2025-07-24 VITALS
DIASTOLIC BLOOD PRESSURE: 81 MMHG | OXYGEN SATURATION: 98 % | SYSTOLIC BLOOD PRESSURE: 154 MMHG | BODY MASS INDEX: 23.62 KG/M2 | HEART RATE: 50 BPM | TEMPERATURE: 97.6 F | RESPIRATION RATE: 16 BRPM | HEIGHT: 70 IN | WEIGHT: 165 LBS

## 2025-07-24 DIAGNOSIS — C79.51 SECONDARY MALIGNANT NEOPLASM OF BONE: ICD-10-CM

## 2025-07-24 PROCEDURE — 99213 OFFICE O/P EST LOW 20 MIN: CPT

## 2025-07-24 PROCEDURE — 36415 COLL VENOUS BLD VENIPUNCTURE: CPT

## 2025-07-31 ENCOUNTER — APPOINTMENT (OUTPATIENT)
Dept: RADIATION ONCOLOGY | Facility: CLINIC | Age: 75
End: 2025-07-31
Payer: MEDICARE

## 2025-07-31 VITALS
SYSTOLIC BLOOD PRESSURE: 125 MMHG | OXYGEN SATURATION: 98 % | DIASTOLIC BLOOD PRESSURE: 71 MMHG | RESPIRATION RATE: 18 BRPM | HEART RATE: 50 BPM | BODY MASS INDEX: 23.62 KG/M2 | WEIGHT: 165 LBS | HEIGHT: 70 IN

## 2025-07-31 DIAGNOSIS — C61 MALIGNANT NEOPLASM OF PROSTATE: ICD-10-CM

## 2025-07-31 PROCEDURE — 99214 OFFICE O/P EST MOD 30 MIN: CPT

## 2025-07-31 PROCEDURE — G2211 COMPLEX E/M VISIT ADD ON: CPT
